# Patient Record
Sex: FEMALE | Race: WHITE | NOT HISPANIC OR LATINO | Employment: FULL TIME | URBAN - METROPOLITAN AREA
[De-identification: names, ages, dates, MRNs, and addresses within clinical notes are randomized per-mention and may not be internally consistent; named-entity substitution may affect disease eponyms.]

---

## 2021-09-12 ENCOUNTER — TELEMEDICINE (OUTPATIENT)
Dept: FAMILY MEDICINE CLINIC | Facility: TELEHEALTH | Age: 26
End: 2021-09-12

## 2021-09-12 DIAGNOSIS — R11.2 NON-INTRACTABLE VOMITING WITH NAUSEA, UNSPECIFIED VOMITING TYPE: ICD-10-CM

## 2021-09-12 DIAGNOSIS — R11.0 NAUSEA: Primary | ICD-10-CM

## 2021-09-12 PROCEDURE — 99213 OFFICE O/P EST LOW 20 MIN: CPT | Performed by: NURSE PRACTITIONER

## 2021-09-12 RX ORDER — ONDANSETRON 4 MG/1
4 TABLET, ORALLY DISINTEGRATING ORAL EVERY 8 HOURS PRN
Qty: 9 TABLET | Refills: 0 | Status: SHIPPED | OUTPATIENT
Start: 2021-09-12 | End: 2022-06-30

## 2021-09-12 NOTE — PATIENT INSTRUCTIONS
Nausea and Vomiting, Adult  Nausea is feeling sick to your stomach or feeling that you are about to throw up (vomit). Vomiting is when food in your stomach is thrown up and out of the mouth. Throwing up can make you feel weak. It can also make you lose too much water in your body (get dehydrated). If you lose too much water in your body, you may:  · Feel tired.  · Feel thirsty.  · Have a dry mouth.  · Have cracked lips.  · Go pee (urinate) less often.  Older adults and people with other diseases or a weak body defense system (immune system) are at higher risk for losing too much water in the body. If you feel sick to your stomach and you throw up, it is important to follow instructions from your doctor about how to take care of yourself.  Follow these instructions at home:  Watch your symptoms for any changes. Tell your doctor about them. Follow these instructions to care for yourself at home.  Eating and drinking         · Take an ORS (oral rehydration solution). This is a drink that is sold at pharmacies and stores.  · Drink clear fluids in small amounts as you are able, such as:  ? Water.  ? Ice chips.  ? Fruit juice that has water added (diluted fruit juice).  ? Low-calorie sports drinks.  · Eat bland, easy-to-digest foods in small amounts as you are able, such as:  ? Bananas.  ? Applesauce.  ? Rice.  ? Low-fat (lean) meats.  ? Toast.  ? Crackers.  · Avoid drinking fluids that have a lot of sugar or caffeine in them. This includes energy drinks, sports drinks, and soda.  · Avoid alcohol.  · Avoid spicy or fatty foods.  General instructions  · Take over-the-counter and prescription medicines only as told by your doctor.  · Drink enough fluid to keep your pee (urine) pale yellow.  · Wash your hands often with soap and water. If you cannot use soap and water, use hand .  · Make sure that all people in your home wash their hands well and often.  · Rest at home while you get better.  · Watch your condition  for any changes.  · Take slow and deep breaths when you feel sick to your stomach.  · Keep all follow-up visits as told by your doctor. This is important.  Contact a doctor if:  · Your symptoms get worse.  · You have new symptoms.  · You have a fever.  · You cannot drink fluids without throwing up.  · You feel sick to your stomach for more than 2 days.  · You feel light-headed or dizzy.  · You have a headache.  · You have muscle cramps.  · You have a rash.  · You have pain while peeing.  Get help right away if:  · You have pain in your chest, neck, arm, or jaw.  · You feel very weak or you pass out (faint).  · You throw up again and again.  · You have throw up that is bright red or looks like black coffee grounds.  · You have bloody or black poop (stools) or poop that looks like tar.  · You have a very bad headache, a stiff neck, or both.  · You have very bad pain, cramping, or bloating in your belly (abdomen).  · You have trouble breathing.  · You are breathing very quickly.  · Your heart is beating very quickly.  · Your skin feels cold and clammy.  · You feel confused.  · You have signs of losing too much water in your body, such as:  ? Dark pee, very little pee, or no pee.  ? Cracked lips.  ? Dry mouth.  ? Sunken eyes.  ? Sleepiness.  ? Weakness.  These symptoms may be an emergency. Do not wait to see if the symptoms will go away. Get medical help right away. Call your local emergency services (911 in the [place] Do not drive yourself to the hospital.  Summary  · Nausea is feeling sick to your stomach or feeling that you are about to throw up (vomit). Vomiting is when food in your stomach is thrown up and out of the mouth.  · Follow instructions from your doctor about eating and drinking to keep from losing too much water in your body.  · Take over-the-counter and prescription medicines only as told by your doctor.  · Contact your doctor if your symptoms get worse or you have new symptoms.  · Keep all follow-up  visits as told by your doctor. This is important.  This information is not intended to replace advice given to you by your health care provider. Make sure you discuss any questions you have with your health care provider.  Document Revised: 04/10/2020 Document Reviewed: 05/28/2019  Elsevier Patient Education © 2021 [employer]

## 2021-09-12 NOTE — PROGRESS NOTES
CHIEF COMPLAINT  No chief complaint on file.        [facility]  Rabia Mari is a 26 y.o. female  presents with complaint of food poisoning from chicken she ate last night. She began having n/v and diarrhea this morning which has lingered thru out the day. She has mild cramping in her abdomen and is using a warm heating pad which helps. She reports no fever.     Review of Systems   Constitutional: Negative.    HENT: Negative.    Respiratory: Negative.    Cardiovascular: Negative.  Leg swelling: mild.   Gastrointestinal: Positive for abdominal pain, diarrhea, nausea and vomiting.   Genitourinary: Negative.    Musculoskeletal: Negative.    Skin: Negative.    Neurological: Negative.    Psychiatric/Behavioral: Negative.        History reviewed. No pertinent past medical history.    History reviewed. No pertinent family history.    Social History     Socioeconomic History   • Marital status:      Spouse name: Not on file   • Number of children: Not on file   • Years of education: Not on file   • Highest education level: Not on file         There were no vitals taken for this visit.    PHYSICAL EXAM  Physical Exam   Constitutional: She is oriented to person, place, and time. She appears well-developed and well-nourished. She does not have a sickly appearance. She does not appear ill. No distress.   Pulmonary/Chest: Effort normal.  No respiratory distress.  Abdominal: Abdomen appears normal. Soft. She exhibits no distension. There is no abdominal tenderness.   Per self directed exam   Neurological: She is alert and oriented to person, place, and time.   Psychiatric: She has a normal mood and affect.       No results found for this or any previous visit.    Diagnoses and all orders for this visit:    1. Nausea (Primary)  -     ondansetron ODT (Zofran ODT) 4 MG disintegrating tablet; Place 1 tablet on the tongue Every 8 (Eight) Hours As Needed for Nausea or Vomiting.  Dispense: 9 tablet; Refill: 0    2. Non-intractable  vomiting with nausea, unspecified vomiting type  -     ondansetron ODT (Zofran ODT) 4 MG disintegrating tablet; Place 1 tablet on the tongue Every 8 (Eight) Hours As Needed for Nausea or Vomiting.  Dispense: 9 tablet; Refill: 0    clear liquids only.   No milk or dairy products.   Rest and sip on clear liquids to avoid dehydration  To ED for worsening S/s.       FOLLOW-UP  As discussed during visit with PCP/[facility] if no improvement or Urgent Care/Emergency Department if worsening of symptoms    Patient verbalizes understanding of medication dosage, comfort measures, instructions for treatment and follow-up.    Tiffanie Montanez, FITO  09/12/2021  16:40 EDT    This visit was performed via Telehealth.  This patient has been instructed to follow-up with their primary care provider if their symptoms worsen or the treatment provided does not resolve their illness.

## 2022-06-30 ENCOUNTER — E-VISIT (OUTPATIENT)
Dept: FAMILY MEDICINE CLINIC | Facility: TELEHEALTH | Age: 27
End: 2022-06-30

## 2022-06-30 ENCOUNTER — TELEMEDICINE (OUTPATIENT)
Dept: FAMILY MEDICINE CLINIC | Facility: TELEHEALTH | Age: 27
End: 2022-06-30

## 2022-06-30 DIAGNOSIS — A05.9 FOOD POISONING: Primary | ICD-10-CM

## 2022-06-30 PROCEDURE — 99212 OFFICE O/P EST SF 10 MIN: CPT | Performed by: NURSE PRACTITIONER

## 2022-06-30 RX ORDER — ONDANSETRON 4 MG/1
4 TABLET, ORALLY DISINTEGRATING ORAL EVERY 8 HOURS PRN
Qty: 10 TABLET | Refills: 0 | Status: SHIPPED | OUTPATIENT
Start: 2022-06-30

## 2022-06-30 RX ORDER — ESCITALOPRAM OXALATE 20 MG/1
TABLET ORAL
COMMUNITY

## 2022-06-30 NOTE — PROGRESS NOTES
Subjective   Rabia Mari is a 26 y.o. female.     Her symptoms started yesterday afternoon after she ate a burger from a gas station. Her symptoms started 5-6 hours afterwards. Her symptoms have included diarrhea and some vomiting. Denies sick contacts.        The following portions of the patient's history were reviewed and updated as appropriate: allergies, current medications, past family history, past medical history, past social history, past surgical history, and problem list.    Review of Systems   Constitutional: Positive for appetite change (decreased). Negative for chills, diaphoresis and fever.   HENT: Negative.    Respiratory: Negative.    Gastrointestinal: Positive for abdominal pain (cramping intermittent), diarrhea, nausea and vomiting.   Musculoskeletal: Negative for myalgias.   Neurological: Positive for headache.       Objective   Physical Exam  Constitutional:       General: She is not in acute distress.     Appearance: She is well-developed. She is ill-appearing. She is not diaphoretic.   Pulmonary:      Effort: Pulmonary effort is normal.   Neurological:      Mental Status: She is alert and oriented to person, place, and time.   Psychiatric:         Behavior: Behavior normal.           Assessment & Plan   Diagnoses and all orders for this visit:    1. Food poisoning (Primary)  -     ondansetron ODT (Zofran ODT) 4 MG disintegrating tablet; Place 1 tablet on the tongue Every 8 (Eight) Hours As Needed for Nausea or Vomiting.  Dispense: 10 tablet; Refill: 0                 The use of a video visit has been reviewed with the patient and verbal informed consent has been obtained. Myself and Rabia Mari participated in this visit. The patient is located in [place], [place]. I am located in [place], [place]. Mychart and Zoom were utilized. I spent 10 minutes in the patient's chart for this visit.

## 2022-06-30 NOTE — PATIENT INSTRUCTIONS
"Maintain hydration  - Do not \"chug\" fluids.  Give a few ounces at a time and increase as tolerated  -[place] diet (Dry toast, crackers)  Follow up with PCP if symptoms persist  Go to ER for fever and abdominal pain    "

## 2022-06-30 NOTE — E-VISIT ESCALATED
Patient escalated   Provider Bertha Narayanan chose to escalate patient to another level of care because: Insufficient information to diagnose   Patient was sent the following message:   Based on the information you've provided us, you need to take another step to get care.   What to do now:   Setup a video visit   Please, schedule your video visit   Video visit     You won't be charged for your eVisit. If you paid with a credit card, the charge will be reversed.   Chief Complaint: Heartburn or acid reflux   Patient introduction   Patient is 26-year-old female presenting with nausea for < 2 weeks. Reports symptoms are intermittent. Denies having had GERD symptoms in the past.   Patient-submitted comments   Patient writes: I think I have food poisoning. There weren't any options to choose similar to that. I have vomiting, diarrhea, nausea, and cramping. .   General presentation   GerdQ score: 5, 50% likelihood of GERD   Nausea: Nausea reported once per week.   Denies choking, chronic cough, hoarseness, wheezing, globus pharyngis, belching, and bloating. Denies anorexia, unexplained weight loss, early satiety, odynophagia, and dysphagia. Denies hematochezia, melena, hematemesis, and persistent vomiting. Denies fever.   Chest pain: Patient denies chest pain.   Pregnancy/menstrual status/breastfeeding:   Denies being pregnant. Denies breastfeeding.   Current medications   Denies currently taking any medications for their symptoms.   Reports taking Escitalopram 20 MG [Lexapro].   Medication allergies   None.   Medication contraindication review   No relevant contraindications.   Past medical history   Denies history of gastrointestinal tract cancer in a first-degree relative.   Denies having had an EGD.   Denies having iron-deficiency anemia.   Assessment:   Patient determined to need a level of care not appropriate to be delivered through eVisit.   Plan:   Patient informed of need to seek in-person care   ----------    Electronically signed by FITO Perez on 2022-06-30 at 12:04PM   ----------   Patient Interview Transcript:   Which of these symptoms do you have?    Nausea   Not selected:    Pain in the middle of the upper stomach area (1)    Heartburn, or a burning feeling behind the breastbone (2)    Regurgitation, or movement of stomach acid or undigested food up into the throat or mouth   How many times a week do you have nausea? Select one.    1   Not selected:    2 or 3    4 to 7   Are your symptoms constant, or do they come and go? Select one.    Come and go   Not selected:    Constant    I'm not sure   How long have you had this current episode of symptoms? Select one.    Less than 2 weeks   Not selected:    2 to 4 weeks    1 to 3 months    More than 3 months    I'm not sure   Is this the first time you've had these kinds of symptoms? Select one.    Yes   Not selected:    No    I'm not sure   What makes your symptoms worse? Select all that apply.    None of the above   Not selected:    Stress    Empty stomach    Full stomach    Chocolate    Caffeine    Alcohol    Carbonated drinks, such as soda or sparkling water    Acidic foods, such as tomatoes and oranges    Spicy foods    Large meals or overeating    Lying down    Eating within 2 to 3 hours of bedtime    Nothing makes my symptoms worse    I'm not sure   Have you recently had any of these symptoms? Select all that apply.    None of the above   Not selected:    Loss of appetite    Unexplained weight loss    Feeling full after eating only a small amount    Pain with swallowing    Difficulty swallowing, or feeling as if food is stuck in your throat or chest   Have you recently had any of these symptoms? Select all that apply.    None of the above   Not selected:    Choking    Chronic cough    Hoarse voice    Wheezing    Constant feeling of a lump in your throat    Belching    Bloating   Have you recently had any of these symptoms? Select all that apply.    None of  the above   Not selected:    Blood in your stool    Dark, tarry stools    Vomiting (throwing up) blood    Vomiting (throwing up) more than 3 to 4 times a day for several days   Do you have chest pain? You might also feel it as discomfort, aching, tightness, or squeezing in the chest. Select one.    No   Not selected:    Yes   Have you recently had a fever that can't be explained by a cold or another infection? The gastrointestinal conditions treated here don't cause a fever. Select one.    No   Not selected:    Yes    I'm not sure   Do you currently have or are you currently being treated for iron-deficiency anemia? Iron-deficiency anemia means that your body doesn't have enough iron to make healthy red blood cells. Select one.    No   Not selected:    Yes    I'm not sure   Have you ever had an upper endoscopy (EGD) study? An EGD is performed by a gastroenterologist or GI doctor. The doctor uses a thin scope with a camera to look at the lining of the upper digestive tract. Select one.    No   Not selected:    Yes    I'm not sure   Have any of your first-degree relatives had a cancer of the gastrointestinal tract? First-degree relatives include parents, siblings, and children. Gastrointestinal cancers include esophageal cancer, stomach cancer, small intestine cancers, pancreatic cancer, and colon cancer. Select one.    No   Not selected:    Yes    I'm not sure   Are you pregnant? Select one.    No   Not selected:    Yes   When was your last menstrual period? If you don't currently have periods or no longer have periods, please briefly explain.   The patient did not enter any additional information.   Are you breastfeeding? Select one.    No   Not selected:    Yes   What are you currently taking for your symptoms? Select all that apply.    None   Not selected:    Antacids such as Tums, Rolaids, Maalox, or Pepto-Bismol    Pepcid (famotidine)    Zantac (ranitidine)    Tagamet (cimetidine)    Axid (nizatidine)     Prilosec (omeprazole)    Prevacid (lansoprazole)    Nexium (esomeprazole)    Protonix (pantoprazole)    Dexilant (dexlansoprazole)    Aciphex (rabeprazole)    Other (specify)   Are you taking any other medications or supplements? On the next screen, you need to list all vitamins, supplements, non-prescription medications (such as aspirin or Aleve), and prescription medications that you're taking. Select one.    Yes   Not selected:    Yes, but I'm not sure what they are    No   Have you ever had an allergic or bad reaction to any medication? Select one.    No   Not selected:    Yes   Is there anything else you'd like to tell us about your symptoms?    I think I have food poisoning. There weren't any options to choose similar to that. I have vomiting, diarrhea, nausea, and cramping.   ----------   Medical history   Medical history data does not currently exist for this patient.

## 2022-12-29 ENCOUNTER — E-VISIT (OUTPATIENT)
Dept: FAMILY MEDICINE CLINIC | Facility: TELEHEALTH | Age: 27
End: 2022-12-29
Payer: MEDICAID

## 2022-12-29 PROCEDURE — BRIGHTMDVISIT: Performed by: NURSE PRACTITIONER

## 2022-12-29 NOTE — E-VISIT TREATED
Chief Complaint: Migraine or headache   Patient introduction   Patient is 27-year-old female who has bilateral headache pain.   Warning. The following may warrant further investigation:    Pain is worse when patient is upright (and better when they are lying down)   Patient requests a 1-day excuse note.   General presentation   Patient's headache pain is moderate. Headache has lasted 4 to 72 hours. Headache reached maximum intensity in 30 to 60 minutes. Headache pain described as throbbing/pulsating. Headache pain is not worse with Valsalva maneuvers.   Pain is not brought on by intense physical activity. No fever, nasal congestion, nasal discharge, or facial pain.   Migraine features: - Pain is accompanied by nausea, vomiting, photophobia, phonophobia, and decreased ability to perform regular daily activities. - No prodrome present in the 1 to 2 days leading up to headache, including no constipation, depression, euphoria, food cravings, increased yawning, irritability, or neck stiffness. - Has auditory aura just before or at the beginning of their headaches. - No postdrome, including no feelings of being drained or exhausted, mild euphoria, or pain in the location of the previous headache with sudden head movement. - No blurry vision with neck flexion, diminished peripheral vision, double vision, severe reduction in vision, halos, or complete vision loss in one eye.   Potential triggers: sleep pattern changes and stress.   Patient has not made recent changes in hormonal birth control method.   Patient has had similar headaches in the past. Has not seen a healthcare provider for their headaches. Headaches are not getting worse over time. Patient has not had a headache every day in the past month. In the past 3 months, patient has had headaches on 2 to 14 days per month.   No known family history of migraine.   Patient is using medications 0 to 1 days per week to treat their headache.   Prescription medications:    Has not used any prescription medications for headache symptoms.   Non-prescription medications:   The following non-prescription medications were helpful for headache symptoms: - Acetaminophen   Review of red flags/alarm symptoms:    No exposure to carbon monoxide    No recent head trauma    No unexplained fever    No chronic night sweats    No prolonged, severe fatigue    No unintentional weight loss    No confusion    No impaired alertness    No altered speech    No loss of coordination    No prolonged limb numbness    No seizures    No diaphoresis or tachycardia    No current use of blood thinning medication    No exposure to toxic chemicals/ingredients   Self-exam:    No nuchal rigidity    Able to walk on toes/heels    Able to rise from a seated position without support    Pericranial muscle tenderness in the forehead   Pregnancy/menstrual status/breastfeeding:   Not pregnant. Pre-eclampsia screening: Patient has not had a baby in last 6 weeks. Not breastfeeding. Regarding last menstrual period, patient writes: I don't currently have periods due to my IUD. . Patient does not currently menstruate or no longer menstruates.   Past medical history   Immune conditions: No immunocompromising conditions. No history of cancer.   No COPD, Lyme disease, obesity hypoventilation syndrome, or obstructive sleep apnea.   Social history   Patient does not smoke tobacco. No recreational drug use.   Current medications   Currently taking escitalopram 20 MG tablet.   Medication allergies   None.   Medication contraindication review   No history of ASA- or NSAID-induced asthma or urticaria, aspirin triad, bone marrow depression, phenothiazine-induced blood dyscrasia, catecholamine-releasing paraganglioma, coagulation disorder, depression, G6PD deficiency, GI bleeding, GI obstruction, uncontrolled hypertension, Parkinson's disease, Reye syndrome, or seizure disorder.   No history of cardiac accessory conduction pathway disorder,  cerebrovascular disease, coronary vasospasm, chronic kidney disease, hepatic impairment (severe), ischemic bowel disease, ischemic heart disease, migraine (basilar or hemiplegic), PVD, sepsis, vascular surgery (recent), or WPW syndrome.   Assessment   Migraine.   This is the likely diagnosis based on patient's interview responses, including: - Throbbing or pulsating pain - Moderate intensity    Presence of nausea, vomiting, photophobia, phonophobia, and decreased ability to perform regular daily activities    Duration of headache 4 to 72 hours   Plan   Medications:    ibuprofen 800 mg tablet RX 800mg 1 tab PO q8h PRN 7d PRN for pain. For best results, take this medication as soon as you feel headache symptoms. Take with food to avoid an upset stomach. Do not take more than 3200mg (4 tablets) in a 24-hour period. Amount is 18 tab.   The patient's prescription will be sent to:   [facility] [id number]   [address] [place] [address]   Phone: [phone]     Fax: [phone]   Other:   Patient was given an excuse note for 1 day.   Education:    Condition and causes    Prevention    Treatment and self-care    When to call provider, including strict return precautions if pain hasn't resolved within 24 hours   ----------   Electronically signed by FITO Guthrie on 2022-12-29 at 14:35PM   ----------   Patient Interview Transcript:   Where do you feel your headache pain? Select one.    On both sides of my head (or all over)   Not selected:    On one side of my head (including forehead or back of head)    I'm not sure   How long have you had this headache? Select one.    4 hours to 3 days   Not selected:    Less than 30 minutes    30 minutes to 3 hours    More than 3 days    I don't currently have a headache   How would you describe your headache? Select all that apply.    It's throbbing or pulsating   Not selected:    Band-like pressure or tightening    Dull pain    A feeling of head fullness     Pressure-like pain    It feels like I'm wearing a tight cap    None of the above   On a scale of 1 to 10, how severe is your headache pain? If you have a very severe headache, you may need to be seen in person. Select one.    Moderate; strong pain that makes everyday activities harder   Not selected:    Mild; pain is noticeable and distracting, but I can do everyday activities    Severe; intense pain that prevents me from doing some everyday activities    Unbearable; intense pain that prevents me from doing anything    The worst headache of my life   How long did it take for your headache to reach the greatest intensity? The pain of most headaches builds gradually. In rare cases, headache pain comes on suddenly, like a clap of thunder or an explosion, and the pain reaches greatest intensity within 1 minute. Select one.    30 to 60 minutes   Not selected:    Less than 1 minute    1 to 5 minutes    5 to 30 minutes    Longer than 1 hour   Do you currently have any of these symptoms? Select all that apply.    Nausea    Vomiting    Sensitivity to light    Sensitivity to noise    Decreased ability to work, study, or do regular daily activities for at least one day   Not selected:    Headache pain that's worse with physical activity (walking or climbing stairs)    None of the above   Are any of these true for your headache? Select all that apply.    It's worse when I'm upright (and better when I'm lying down)   Not selected:    It's gotten better, but it hasn't gone away    It went away, but then it came back    It wakes me from sleep    It's worse in the morning    It's worse when I bend over    None of the above   Do you currently have any of these symptoms? Select all that apply.    None of these   Not selected:    Fever    Nasal congestion    Thick yellow or green nasal drainage (mucus)    Pain around the eyes or cheeks   Do you currently have any of these symptoms? Select all that apply.    None of the above   Not  "selected:    Confusion    Difficulty staying awake or alert    Difficulty speaking or understanding speech    Difficulty walking    Numbness, tingling, or weakness in any of your limbs that doesn't go away    Seizures    Severe jaw pain    Painful rash that looks like a band of blisters on the same side of your head or face as your headache   Headaches can be a sign of a serious underlying condition. Within the last month, have you had any of these symptoms? Select all that apply.    None of the above   Not selected:    Unexplained fever (no clear source or obvious infection)    Recurring night sweats    Severe fatigue that doesn't improve with rest    Unintentional weight loss   Have you had any of these vision changes? Select all that apply.    None of the above   Not selected:    Blurry vision when moving your chin toward your chest    Loss of peripheral vision (can't see out of the corners of the eyes)    Double vision    Severe difficulty seeing    Seeing halos (bright circles) around lights    Complete loss of vision in one eye   Sore or tight muscles in the neck and shoulders can play a part in causing headaches. Gently press against your head and neck muscles, making small circular movements on the muscles. Do this on both your right and left sides.    Forehead   Not selected:    Jaw    Back of head    Neck    Upper shoulder    None of the above   Is your headache pain worse with Valsalva maneuvers? A common Valsalva maneuver involves closing your mouth, plugging your nose shut, and breathing out gently in a way that \"pops\" your ears. Bearing down as if having a bowel movement is another example of a Valsalva maneuver. Select one.    No   Not selected:    Yes    I'm not sure   Can you move your chin toward your chest?    Yes   Not selected:    No, my neck is too stiff   We'd like you to perform three simple tasks: 1. Get up from a seated position without using your arms (or anyone's help) to push yourself up " 2. Walk on your toes for 10 steps 3. Walk on your heels for 10 steps Can you perform all three tasks? Select one.    Yes   Not selected:    No, I can't do one or more of the tasks   Do any of these seem to trigger your headache symptoms? Select all that apply.    Changes in sleep pattern    Stress   Not selected:    Alcohol    Artificial sweeteners (aspartame)    Bright lights or visual stimuli    Certain foods, such as cured meats or cheese    Certain smells    Hunger    Weather changes    No, not that I know of   Does your headache only occur during or after intense physical activity (jogging, weight lifting, or having sex)? Select one.    No   Not selected:    Yes   In the last 2 weeks, have you had any kind of injury or trauma to your head? Examples of injuries might include hitting your head or being hit in the head. Select one.    No   Not selected:    Yes   In the last month, have you had any known exposure to toxic chemicals? For example, this might include accidentally swallowing antifreeze or inhaling paint fumes. Select one.    No, not that I know of   Not selected:    Yes   Have you had any recent exposure to carbon monoxide? Common sources of carbon monoxide include motor vehicle exhaust, smoke from fires, engine fumes, and non-electric heaters. Select one.    No, not that I know of   Not selected:    Yes   Have you ever had a similar headache in the past? By similar, we mean the location of the pain is the same, the headache feels the same, and the associated symptoms are the same. Select one.    Yes   Not selected:    No   Does it feel like your headaches have gotten worse? For example, are they coming on faster, happening more often, or becoming more painful? Select one.    No, not necessarily   Not selected:    Yes, definitely    I'm not sure   Over the past month, have you had a headache every day? Select one.    No   Not selected:    Yes   Over the past 3 months, how many days per month have you  "had a headache? Select one.    2 to 14 days per month   Not selected:    1 day or less per month    15 or more days per month   How many days per week do you use medication for a headache? This includes both non-prescription and prescription medications. Select one.    0 to 1 day per week   Not selected:    1 to 2 days per week    3 or more days per week    I haven't used any medications   Have you ever seen a healthcare provider for your headaches? Select one.    No   Not selected:    Yes   Do you have a family history of migraine headaches? Select one.    No, not that I know of   Not selected:    Yes   In the 1 to 2 days leading up to your headaches, do you have any of these symptoms? Select all that apply.    No, not that I've noticed   Not selected:    Constipation    Depression    Euphoria (feeling intensely excited or happy)    Food cravings    Increased yawning    Irritability    Neck stiffness   Just before or at the beginning of your headaches, do you have any of these symptoms? Some headaches can be associated with other sensory or physical symptoms. They usually last no longer than an hour and then go away completely. Select all that apply.    Ringing in your ears or hearing other noises or music (auditory aura)   Not selected:    Jerking or other repetitive physical movements (motor aura)    Loss of vision, hearing, feeling, or ability to move a body part (negative aura)    Burning, pain, or tingling on any part of your body (somatosensory aura)    Seeing bright lines, shapes, or objects (visual aura)    Difficulty finding words or other changes in speech    None of the above   Once the headache pain goes away, do you have any of these symptoms? Select all that apply.    None of the above   Not selected:    Feeling drained or exhausted    Mild euphoria (excitement or happiness)    Pain in the location of the previous headache with sudden head movement   Are your headaches associated with \"attacks\" or " "\"spells\" of excessive sweating or fast heart rate? Select all that apply.    No, neither of these   Not selected:    Excessive sweating    Fast heart rate   To make a treatment plan that's safe for you, we need to ask about your health history. Do you regularly take any blood thinning medications? Examples include aspirin, Coumadin (warfarin), Aggrenox (aspirin/dipyridamole), Plavix (clopidogrel), Pradaxa (dabigatran), Xarelto (rivaroxaban), Eliquis (apixaban) and Savaysa (edoxaban). Select one.    No   Not selected:    Yes   Do you smoke tobacco? Select one.    No, never   Not selected:    Yes, every day    Yes, some days    No, I quit   In the last month, have you used amphetamines, cocaine, hallucinogens, heroin, or other opioids? Your response to this question will be shared with your care provider only. Select one.    No   Not selected:    Yes   Do you have any of these conditions? Select all that apply.    None of the above   Not selected:    Chronic obstructive pulmonary disease (COPD)    Lyme disease    Obesity hypoventilation syndrome (also known as Pickwickian syndrome)    Obstructive sleep apnea   Do you have any of these conditions? Scroll to see all options. Select all that apply.    None of the above   Not selected:    Aspirin- or NSAID-induced asthma or urticaria    Aspirin triad, Samter's triad, or aspirin-exacerbated respiratory disease (AERD)    Bone marrow depression    Phenothiazine-induced anemia, leukemia, lymphoma, or myeloma    Catecholamine-releasing paraganglioma    Blood clotting disorder    Depression    G6PD deficiency    Gastrointestinal (GI) bleeding    Gastrointestinal (GI) obstruction    Uncontrolled hypertension    Parkinson's disease    Reye syndrome    Seizure disorder   Do you have any of these conditions that can affect the immune system? Scroll to see all options. Select all that apply.    None of these   Not selected:    History of bone marrow transplant    Chronic kidney " disease    Chronic liver disease (including cirrhosis)    HIV/AIDS    Inflammatory bowel disease (Crohn's disease or ulcerative colitis)    Lupus    Moderate to severe plaque psoriasis    Multiple sclerosis    Rheumatoid arthritis    Sickle cell anemia    Alpha or beta thalassemia    History of solid organ transplant (kidney, liver, or heart)    History of spleen removal    An autoimmune disorder not listed here    A condition requiring treatment with long-term use of oral steroids (such as prednisone, prednisolone, or dexamethasone)   Have you ever been diagnosed with cancer? Select one.    No   Not selected:    Yes, I have cancer now    Yes, but I'm in remission   Have you had any of these conditions or procedures? Scroll to see all options. Select all that apply.    None of the above   Not selected:    Cardiac accessory conduction pathway disorder-associated arrhythmia    Cerebrovascular disease (stroke or TIA)    Coronary vasospasm (angina)    Ischemic bowel disease    Ischemic heart disease (history of myocardial infarction or heart attack)    Kidney disease (chronic)    Liver disease    Metoclopramide-associated dystonic reaction    Migraine diagnosed as basilar or hemiplegic    Peripheral vascular disease (PVD)    Sepsis    Tardive dyskinesia    Vascular surgery (recent)    Lety-Parkinson-White (WPW) syndrome   Are you pregnant? Select one.    No   Not selected:    Yes   Have you had a baby within the last 6 weeks? Select one.    No   Not selected:    Yes   When was your last menstrual period? If you don't currently have periods or no longer have periods, please briefly explain.    I don't currently have periods due to my IUD.   Do your headaches seem to be related to your menstrual cycle? For example, do you get a headache around the same time every month? Select one.    I don't currently have periods or no longer have periods   Not selected:    Yes    No    I'm not sure   Are you breastfeeding? Select  one.    No   Not selected:    Yes   Have you recently made any changes to your hormonal birth control method (pill, ring, implant, injection, IUD with progesterone)? Select one.    No   Not selected:    Started using hormonal birth control    Stopped using hormonal birth control    Switched hormonal birth control methods    I don't use hormonal birth control   Have you used any non-prescription medications for your headache symptoms? Select one.    Yes   Not selected:    No   Acetaminophen (Tylenol)    Helpful   Not selected:    Not helpful   Have you used any prescription medications for your headache symptoms? Select one.    No   Not selected:    Yes   Are you still taking these medications listed in your medical record? If you're not taking any of these, click Next. Select all that apply.    escitalopram 20 MG tablet   Are you taking any other medications, vitamins, or supplements? Select one.    No   Not selected:    Yes   Have you ever had an allergic or bad reaction to any medication? Select one.    No   Not selected:    Yes   Do you need a doctor's note? A doctor's note confirms that you received care today and states when you can return to school or work. It does not contain information about your diagnosis or treatment plan. Your provider will make the final decision on whether to give you a doctor's note. Doctor's notes CANNOT be backdated. Select one.    Today only (1 day)   Not selected:    Today and tomorrow (2 days)    3 days    No   Is there anything else you'd like to tell us about your symptoms?   The patient did not enter any additional information.   ----------   Medical history   Medical history data does not currently exist for this patient.

## 2022-12-29 NOTE — EXTERNAL PATIENT INSTRUCTIONS
View Doctor's Note     Diagnosis   Migraine   My name is Nereyda Zabala, and I'm a healthcare provider at [facility]. Based on your interview responses, I see that you have a migraine.   I've given you a doctor's note for 1 day.   Medications   Your pharmacy   [facility] [id number] [id number] [phone]     Prescription   Ibuprofen (800mg): Take 1 tablet by mouth every 8 hours as needed for pain for up to 7 days. For best results, take this medication as soon as you feel headache symptoms. Take with food to avoid an upset stomach. Do not take more than 3200mg (4 tablets) in a 24-hour period.    I've given you a prescription dose of ibuprofen. If it's more affordable or convenient, you may use the equivalent amount of non-prescription ibuprofen. For example, instead of taking one 800 mg ibuprofen tablet, you may take four 200 mg ibuprofen tablets. Don't take ibuprofen with other non-steroidal anti-inflammatory drugs (NSAIDs), including naproxen or aspirin. Taking these medications together can increase the risk of serious side effects.   About your diagnosis   Nearly 90% of all headaches can be placed into one of three categories: migraines, tension-type headaches, and cluster headaches. Common signs and symptoms of a migraine include:    Significant pain on one side of the head. However, in about 30% of migraine sufferers, pain is felt on both sides of the head.    Pain that comes on gradually. That pain may build up over several hours to several days, from dull, deep, and steady to pulsating or throbbing.    Nausea or vomiting.    Sensitivity to bright lights or noise.    Pain that feels worse with physical activity.    An aura. These are symptoms that occur before the headache begins and may include flashing lights or bright spots, strange smells, and tingling in the lips, tongue, or fingers.   Scientists and medical professionals don't really know what causes  "migraines. Both genetics and environmental factors seem to play a role. Women are three times more likely to have migraines than men. For some women, their migraine attacks began or became worse during a pregnancy.   Migraines may be severe and uncomfortable at the time, but they don't cause any lasting effects.   What to expect   Most migraine headaches last a few hours. In some cases, migraines may take longer to get better, sometimes up to a few days.   When to seek care   Call us at [phone]   with any sudden or unexpected symptoms.    Your headache comes with seizures, loss of consciousness, mental confusion, trouble speaking, or vision changes.    Your headache is associated with a fever over 103F or a fever that lasts more than 3 days.    Your headache comes on suddenly (sometimes called a \"thunderclap\") and is extremely painful.    Your headache can be described as the \"worst headache of your life.\"    Your headache gets worse immediately after doing some intense physical activity (such as jogging, weight lifting, or having sex).    You mentioned your headache has one or more of these features: it wakes you up, it's worse in the morning or when you bend over, or it's better when you lie down and worse when you're upright. There's a small chance your headache may be due to a more complicated condition. Call us immediately if your headache gets worse or doesn't get better within 24 hours of following your treatment plan.    You have headaches on 15 or more days per month over a 3-month period.    You have changes in the location of your headache pain, severity of pain, frequency of headache, or you develop other symptoms associated with your headache.   Other treatment   Taking headache medications on more than 3 days a week can cause \"medication-overuse\" or \"rebound\" headaches. If you need to take more medications or take medications more often, contact us to make an appointment.   People with " migraine headaches often feel better if they lie down in a dark, quiet room. Non-traditional therapies can also be helpful. These therapies include acupuncture, biofeedback (which teaches you how to monitor and manage your stress), massage therapy, and cognitive behavioral therapy.   Prevention   Migraine headaches may be triggered by:    Hormonal changes due to menstruation, pregnancy, or contraceptives (such as birth control pills).    Stress.    Hunger.    Changes in sleep patterns, including both lack of sleep (fatigue) and getting too much sleep.    Weather changes.    Bright lights or other visual stimuli.    Consuming certain foods or drinks, especially those that contain the sweetener aspartame. Processed foods and alcohol, especially wine and highly carbonated beverages, may also trigger migraines.   Because there are many different migraine triggers, some people find it helpful to keep a headache diary. Keep track of when you have headaches and write down notes about food, sleep, stress, and the weather. A headache diary can help you figure out your migraine triggers so you can try to avoid them.   The treatment plan that works best for you will be one that's tailored to you and your triggers. You may find that a triptan medication works for you. Or you may find that taking ibuprofen as soon as you feel a headache, prodrome, or aura helps your symptoms. It may take some trial and error to find the best prevention and treatment plan. Keeping a headache diary is especially helpful while you figure this out.   Your provider   Your diagnosis was provided by Nereyda Zabala, a member of your trusted care team at [facility].   If you have any questions, call us at [phone]  .   View Doctor's Note     Expires on 01/28/23

## 2024-07-15 ENCOUNTER — E-VISIT (OUTPATIENT)
Dept: FAMILY MEDICINE CLINIC | Facility: TELEHEALTH | Age: 29
End: 2024-07-15

## 2024-07-15 PROCEDURE — BRIGHTMDVISIT: Performed by: NURSE PRACTITIONER

## 2024-07-15 NOTE — E-VISIT TREATED
Chief Complaint: Migraine or headache   Patient introduction   Patient is 28-year-old female who has bilateral headache pain.   General presentation   Patient's headache pain is moderate. Headache has lasted 4 to 72 hours. Headache reached maximum intensity in 5 to 30 minutes. Headache pain described as fullness. Pain is not associated with a certain position, does not wake them from sleep, and is not worse in the morning. Headache pain is not worse with Valsalva maneuvers.   Pain is not brought on by intense physical activity. No fever, nasal congestion, nasal discharge, or facial pain.   Migraine features:    Pain is accompanied by decreased ability to perform regular daily activities.    No blurry vision with neck flexion, diminished peripheral vision, double vision, severe reduction in vision, halos, or complete vision loss in one eye.   Headaches are not triggered by alcohol, aspartame, visual stimuli, certain foods, certain smells, sleep pattern changes, hunger, stress, or weather changes.   Patient has not made recent changes in hormonal birth control method.   Patient has had similar headaches in the past. Patient has been diagnosed with cluster headaches. Headaches are not getting worse over time. Patient has not had a headache every day in the past month. In the past 3 months, patient has had headaches on 1 day per month.   No known family history of migraine.   Patient is using medications 0 to 1 days per week to treat their headache.   Prescription medications:   Has not used any prescription medications for headache symptoms.   Non-prescription medications:   The following non-prescription medications were helpful for headache symptoms:    Acetaminophen    Excedrin Tension Headache (acetaminophen/caffeine)   Review of red flags/alarm symptoms:    No exposure to carbon monoxide    No recent head trauma    No unexplained fever    No chronic night sweats    No prolonged, severe fatigue    No  unintentional weight loss    No confusion    No impaired alertness    No altered speech    No loss of coordination    No prolonged limb numbness    No seizures    No diaphoresis or tachycardia    No current use of blood thinning medication    No exposure to toxic chemicals/ingredients   Self-exam:    No nuchal rigidity    Able to walk on toes/heels    Able to rise from a seated position without support    No pericranial muscle tenderness in the forehead, jaw, occiput, neck, or upper shoulders   Pregnancy/menstrual status/breastfeeding:   Not pregnant. Pre-eclampsia screening: Patient has not had a baby in last 6 weeks. Not breastfeeding. Regarding date of last menstrual period, patient writes: I have an IUD. I don't have periods. Patient does not currently menstruate or no longer menstruates.   Current medications   Currently taking escitalopram 20 MG tablet and Bariatric Multivitamins with 45 mg Iron.   Medication allergies   None.   Medication contraindication review   No history of ASA- or NSAID-induced asthma or urticaria, aspirin triad, bone marrow depression, phenothiazine-induced blood dyscrasia, catecholamine-releasing paraganglioma, coagulation disorder, depression, G6PD deficiency, GI bleeding, GI obstruction, uncontrolled hypertension, Parkinson's disease, Reye syndrome, or seizure disorder.   No history of cardiac accessory conduction pathway disorder, cerebrovascular disease, coronary vasospasm, chronic kidney disease, hepatic impairment (severe), ischemic bowel disease, ischemic heart disease, migraine (basilar or hemiplegic), PVD, sepsis, vascular surgery (recent), or WPW syndrome.   Past medical history   Immune conditions: No immunocompromising conditions.   No history of cancer.   No COPD, Lyme disease, obesity hypoventilation syndrome, or obstructive sleep apnea.   Social history   Patient does not smoke tobacco. No recreational drug use.   Patient requests a 2-day excuse note.   Assessment    Tension-type headache.   This is the likely diagnosis based on patient's interview responses, including:    Pain is not throbbing or pulsating    Moderate intensity   Plan   Medications:   No medications prescribed.   Other:   Patient was given an excuse note for 2 days.   Education:    Condition and causes    Prevention    Treatment and self-care    When to call provider, including strict return precautions if pain hasn't resolved within 24 hours   ----------   Electronically signed by FITO Perez on 2024-07-15 at 17:37PM   ----------   Patient Interview Transcript:   Where do you feel your headache pain? Select one.    On both sides of my head (or all over)   Not selected:    On one side of my head (including forehead or back of head)    I'm not sure   How long have you had this headache? Select one.    4 hours to 3 days   Not selected:    Less than 30 minutes    30 minutes to 3 hours    More than 3 days    I don't currently have a headache   How would you describe your headache? Select all that apply.    A feeling of head fullness   Not selected:    Band-like pressure or tightening    Dull pain    Pressure-like pain    It's throbbing or pulsating    It feels like I'm wearing a tight cap    None of the above   On a scale of 1 to 10, how severe is your headache pain? If you have a very severe headache, you may need to be seen in person. Select one.    Moderate; strong pain that makes everyday activities harder   Not selected:    Mild; pain is noticeable and distracting, but I can do everyday activities    Severe; intense pain that prevents me from doing some everyday activities    _Unbearable; intense pain that prevents me from doing anything _    The worst headache of my life   How long did it take for your headache to reach the greatest intensity? The pain of most headaches builds gradually. In rare cases, headache pain comes on suddenly, like a clap of thunder or an explosion, and the pain reaches  greatest intensity within 1 minute. Select one.    5 to 30 minutes   Not selected:    Less than 1 minute    1 to 5 minutes    30 to 60 minutes    Longer than 1 hour   Do you currently have any of these symptoms? Select all that apply.    Decreased ability to work, study, or do regular daily activities for at least one day   Not selected:    Nausea    Vomiting    Sensitivity to light    Sensitivity to noise    Headache pain that's worse with physical activity (walking or climbing stairs)    None of the above   Are any of these true for your headache? Select all that apply.    None of the above   Not selected:    It's gotten better, but it hasn't gone away    It went away, but then it came back    It wakes me from sleep    It's worse in the morning    It's worse when I bend over    It's worse when I'm upright (and better when I'm lying down)   Do you currently have any of these symptoms? Select all that apply.    None of these   Not selected:    Fever    Nasal congestion    Thick yellow or green nasal drainage (mucus)    Pain around the eyes or cheeks   Do you currently have any of these symptoms? Select all that apply.    None of the above   Not selected:    Confusion    Difficulty staying awake or alert    Difficulty speaking or understanding speech    Difficulty walking    Numbness, tingling, or weakness in any of your limbs that doesn't go away    Seizures    Severe jaw pain    Painful rash that looks like a band of blisters on the same side of your head or face as your headache   Headaches can be a sign of a serious underlying condition. Within the last month, have you had any of these symptoms? Select all that apply.    None of the above   Not selected:    Unexplained fever (no clear source or obvious infection)    Recurring night sweats    Severe fatigue that doesn't improve with rest    Unintentional weight loss   Have you had any of these vision changes? Select all that apply.    None of the above   Not  "selected:    Blurry vision when moving your chin toward your chest    Loss of peripheral vision (can't see out of the corners of the eyes)    Double vision    Severe difficulty seeing    Seeing halos (bright circles) around lights    Complete loss of vision in one eye   Sore or tight muscles in the neck and shoulders can play a part in causing headaches. Gently press against your head and neck muscles, making small circular movements on the muscles. Do this on both your right and left sides.    None of the above   Not selected:    Forehead    Jaw    Back of head    Neck    Upper shoulder   Is your headache pain worse with Valsalva maneuvers? A common Valsalva maneuver involves closing your mouth, plugging your nose shut, and breathing out gently in a way that \"pops\" your ears. Bearing down as if having a bowel movement is another example of a Valsalva maneuver. Select one.    No   Not selected:    Yes    I'm not sure   Can you move your chin toward your chest?    Yes   Not selected:    No, my neck is too stiff   We'd like you to perform three simple tasks: 1. Get up from a seated position without using your arms (or anyone's help) to push yourself up 2. Walk on your toes for 10 steps 3. Walk on your heels for 10 steps Can you perform all three tasks? Select one.    Yes   Not selected:    No, I can't do one or more of the tasks   Do any of these seem to trigger your headache symptoms? Select all that apply.    No, not that I know of   Not selected:    Alcohol    Artificial sweeteners (aspartame)    Bright lights or visual stimuli    Certain foods, such as cured meats or cheese    Certain smells    Changes in sleep pattern    Hunger    Stress    Weather changes   Does your headache only occur during or after intense physical activity (jogging, weight lifting, or having sex)? Select one.    No   Not selected:    Yes   In the last 2 weeks, have you had any kind of injury or trauma to your head? Examples of injuries " might include hitting your head or being hit in the head. Select one.    No   Not selected:    Yes   In the last month, have you had any known exposure to toxic chemicals? For example, this might include accidentally swallowing antifreeze or inhaling paint fumes. Select one.    No, not that I know of   Not selected:    Yes   Have you had any recent exposure to carbon monoxide? Common sources of carbon monoxide include motor vehicle exhaust, smoke from fires, engine fumes, and non-electric heaters. Select one.    No, not that I know of   Not selected:    Yes   Have you ever had a similar headache in the past? By similar, we mean the location of the pain is the same, the headache feels the same, and the associated symptoms are the same. Select one.    Yes   Not selected:    No   Does it feel like your headaches have gotten worse? For example, are they coming on faster, happening more often, or becoming more painful? Select one.    No, not necessarily   Not selected:    Yes, definitely    I'm not sure   Over the past month, have you had a headache every day? Select one.    No   Not selected:    Yes   Over the past 3 months, how many days per month have you had a headache? Select one.    1 day or less per month   Not selected:    2 to 14 days per month    15 or more days per month   How many days per week do you use medication for a headache? This includes both non-prescription and prescription medications. Select one.    0 to 1 day per week   Not selected:    1 to 2 days per week    3 or more days per week    I haven't used any medications   Have you ever seen a healthcare provider for your headaches? Select one.    Yes   Not selected:    No   When you saw the healthcare provider, was your headache diagnosed as any of these? Select all that apply.    Cluster headache   Not selected:    Migraine headache    Tension-type headache    Other (specify)   Do you have a family history of migraine headaches? Select one.    No,  "not that I know of   Not selected:    Yes   Are your headaches associated with \"attacks\" or \"spells\" of excessive sweating or fast heart rate? Select all that apply.    No, neither of these   Not selected:    Excessive sweating    Fast heart rate   To make a treatment plan that's safe for you, we need to ask about your health history. Do you regularly take any blood thinning medications? Examples include aspirin, Coumadin (warfarin), Aggrenox (aspirin/dipyridamole), Plavix (clopidogrel), Pradaxa (dabigatran), Xarelto (rivaroxaban), Eliquis (apixaban) and Savaysa (edoxaban). Select one.    No   Not selected:    Yes   Do you smoke tobacco? Select one.    No, never   Not selected:    Yes, every day    Yes, some days    No, I quit   In the last month, have you used amphetamines, cocaine, hallucinogens, heroin, or other opioids? Your response to this question will be shared with your care provider only. Select one.    No   Not selected:    Yes   Do you have any of these conditions? Select all that apply.    None of the above   Not selected:    Chronic obstructive pulmonary disease (COPD)    Lyme disease    Obesity hypoventilation syndrome (also known as Pickwickian syndrome)    Obstructive sleep apnea   Do you have any of these conditions? Scroll to see all options. Select all that apply.    None of the above   Not selected:    Aspirin- or NSAID-induced asthma or urticaria    Aspirin triad, Samter's triad, or aspirin-exacerbated respiratory disease (AERD)    Bone marrow depression    Phenothiazine-induced anemia, leukemia, lymphoma, or myeloma    Catecholamine-releasing paraganglioma    Blood clotting disorder    Depression    G6PD deficiency    Gastrointestinal (GI) bleeding    Gastrointestinal (GI) obstruction    Uncontrolled hypertension    Parkinson's disease    Reye syndrome    Seizure disorder   Do you have any of these conditions that can affect the immune system? Scroll to see all options. Select all that " apply.    None of these   Not selected:    History of bone marrow transplant    Chronic kidney disease    Chronic liver disease (including cirrhosis)    HIV/AIDS    Inflammatory bowel disease (Crohn's disease or ulcerative colitis)    Lupus    Moderate to severe plaque psoriasis    Multiple sclerosis    Rheumatoid arthritis    Sickle cell anemia    Alpha or beta thalassemia    History of kidney, liver, heart, or other solid organ transplant    History of liver, heart, or other solid organ transplant    History of spleen removal    An autoimmune disorder not listed here (specify)    A condition requiring treatment with long-term use of oral steroids (such as prednisone, prednisolone, or dexamethasone) (specify)   Have you ever been diagnosed with cancer? Select one.    No   Not selected:    Yes, I have cancer now    Yes, but I'm in remission   Have you had any of these conditions or procedures? Scroll to see all options. Select all that apply.    None of the above   Not selected:    Cardiac accessory conduction pathway disorder-associated arrhythmia    Cerebrovascular disease (stroke or TIA)    Coronary vasospasm (angina)    Ischemic bowel disease    Ischemic heart disease (history of myocardial infarction or heart attack)    Kidney disease (chronic)    Liver disease    Metoclopramide-associated dystonic reaction    Migraine diagnosed as basilar or hemiplegic    Peripheral vascular disease (PVD)    Sepsis    Tardive dyskinesia    Vascular surgery (recent)    Lety-Parkinson-White (WPW) syndrome   Are you pregnant? Select one.    No   Not selected:    Yes   Have you had a baby within the last 6 weeks? Select one.    No   Not selected:    Yes   When was your last menstrual period? If you don't currently have periods or no longer have periods, please briefly explain.    I have an IUD. I don't have periods   Do your headaches seem to be related to your menstrual cycle? For example, do you get a headache around the same  time every month? Select one.    I don't currently have periods or no longer have periods   Not selected:    Yes    No    I'm not sure   Are you breastfeeding? Select one.    No   Not selected:    Yes   Have you recently made any changes to your hormonal birth control method (pill, ring, implant, injection, IUD with progesterone)? Select one.    No   Not selected:    Started using hormonal birth control    Stopped using hormonal birth control    Switched hormonal birth control methods    I don't use hormonal birth control   Have you used any non-prescription medications for your headache symptoms? Select one.    Yes   Not selected:    No   Acetaminophen (Tylenol)    Helpful   Not selected:    Not helpful   Excedrin Tension Headache (acetaminophen/caffeine)    Helpful   Not selected:    Not helpful   Have you used any prescription medications for your headache symptoms? Select one.    No   Not selected:    Yes   Are you still taking these medications listed in your medical record? If you're not taking any of these, click Next. Select all that apply.    escitalopram 20 MG tablet   Are you taking any other medications, vitamins, or supplements? Select one.    Yes   Not selected:    No   Have you ever had an allergic or bad reaction to any medication? Select one.    No   Not selected:    Yes   Do you need a doctor's note? A doctor's note confirms that you received care today and states when you can return to school or work. It does not contain information about your diagnosis or treatment plan. Your provider will make the final decision on whether to give you a doctor's note. Doctor's notes CANNOT be backdated. Select one.    Today and tomorrow (2 days)   Not selected:    Today only (1 day)    3 days    No   Is there anything you'd like to add about your symptoms? Please limit your comments to the symptoms covered in this interview. If you include comments about other concerns, your provider may recommend that you be  seen in person.   The patient did not enter any additional information.   ----------   Medical history   Medical history data does not currently exist for this patient.

## 2024-07-15 NOTE — EXTERNAL PATIENT INSTRUCTIONS
"   View Doctor's Note     Diagnosis   Tension-type headache   My name is BerthaFITO Davis, and I'm a healthcare provider at [facility]. Based on your interview responses, I see that you have a tension-type headache.   I've given you a doctor's note for 2 days.   About your diagnosis   Nearly 90% of all headaches can be placed into one of three categories: migraines, tension-type headaches, and cluster headaches.   Common signs and symptoms of a tension-type headache include:    Moderate to severe pain or pressure on both sides of the head. Some people say the pain feels like a thick rubber band stretched tight around their head.    Tension in the scalp, neck, and shoulder muscles.    Symptoms that come and go. The pain may be short-lived, or it may be around for up to a week.    Not usually associated with nausea, vomiting, light sensitivity, or noise sensitivity. This headache is rarely made worse by physical activity, such as walking or climbing stairs.   Scientists and medical professionals don't really know what causes tension-type headaches. These headaches can affect a person's quality of life, but they don't cause any long-term problems.   What to expect   A tension-type headache can last anywhere from 30 minutes to several days and sometimes even up to a week. The treatments recommended here will help you feel better soon.   When to seek care   Call us at [phone]   with any sudden or unexpected symptoms.    Your headache comes with seizures, loss of consciousness, weakness, mental confusion, vision changes, or trouble speaking.    Your headache is associated with a fever over 103F or a fever that lasts more than 3 days.    Your headache comes on suddenly (sometimes called a \"thunderclap\") and is very painful.    Your headache is the \"worst headache of your life.\"    You get a headache immediately after intense physical activity (such as jogging, weight lifting, or having sex).    Your " "headache is worse when lying down and gets better when standing up.    Your headache gets worse or doesn't get better, despite following your treatment plan.    You have headaches on 15 or more days per month over a 3-month period.    You have changes in the location of your headache pain, severity of pain, frequency of headache, or you develop other symptoms associated with your headache.   Other treatment   Taking headache medications on more than 3 days a week can cause \"medication-overuse\" or \"rebound\" headaches. If you need to take more medications or take medications more often, contact us to make an appointment.   People with tension-type headaches often feel better by getting plenty of rest, using an ice pack on their head, or taking long, hot showers. Nontraditional therapies may also be helpful. These include acupuncture, acupressure, biofeedback (which teaches you how to monitor and manage your stress), massage therapy, and cognitive behavioral therapy.   Prevention   Tension-type headaches may be triggered (or worsened) by:    Stress.    Neck or shoulder muscle tension.    Weather changes.   Many people find that keeping a headache diary is helpful. Keep track of when you have headaches and write down notes about food, sleep, stress, and the weather. A headache diary can help you figure out your headache patterns and manage your symptoms.   Finally, consider your posture. Poor posture can cause muscle tension, which can lead to headache pain. When standing, hold your shoulders back, keep your head level, and hold in your stomach. When sitting, bend your legs at the knees, keeping your thighs parallel to the ground. Throughout the day, keep your head from tilting forward and your shoulders from inching toward your ears.   Your provider   Your diagnosis was provided by FITO Perez, a member of your trusted care team at [facility].   If you have any questions, call us at [phone]  .   " View Doctor's Note     Expires on 08/14/24

## 2025-07-24 ENCOUNTER — E-VISIT (OUTPATIENT)
Dept: ADMINISTRATIVE | Facility: OTHER | Age: 30
End: 2025-07-24

## 2025-07-24 ENCOUNTER — E-VISIT (OUTPATIENT)
Dept: FAMILY MEDICINE CLINIC | Facility: TELEHEALTH | Age: 30
End: 2025-07-24

## 2025-07-24 NOTE — E-VISIT ESCALATED
Status: Referred Out  Date: 2025 11:09:15  Acuity Level: Not applicable  Referral message: Based on the information you provided during your interview, eVisit is not appropriate for treating your condition.  Patient: Rabia Mari  Patient : 1995  Patient Address: [address] [place] [address]  Patient Phone: [phone]  Clinician Response: Unavailable  Diagnosis: Unavailable  Diagnosis ICD: Unavailable     Patient Interview Questions and Responses: None available

## 2025-07-24 NOTE — E-VISIT TREATED
"Date: 2025 12:31:20  Clinician: Tiffanie Montanez  Clinician NPI: [id number]  Patient: Rabia Mari  Patient : 1995  Patient Address: [address] [place] [address]  Patient Phone: [phone]  Visit Protocol: Gastrointestinal conditions  Patient Summary:  Rabia is a 29 year old ( : 1995 ) female who initiated a visit for evaluation of a gastrointestinal condition.     Symptom details  The symptoms consist of diarrhea.      Bowel function: The last bowel movement was today. Rabia usually has a bowel movement daily.     Diarrhea: The diarrhea started today. Rabia has had 3-7 bowel movements in the past 24 hours. The diarrhea has not changed since the symptoms started. The diarrhea does not interfere with sleep.      Rabia denies unintentional weight loss, blood in stool, jaundice, feeling feverish, nausea, constipation, vomiting, and abdominal pain. Rabia also denies symptoms of dehydration, such as dry mouth, feeling light-headed, being too weak to stand, and   fainting. Rabia denies having stool that is black or tarry within the last week.   Rabia has tried anti-motility medication for the current symptoms. The anti-motility medication was somewhat helpful.   Monterey IV criteria  Note: Monterey IV criteria defined   in clinical decision support (displayed when \"Irritable bowel syndrome with diarrhea\", \"Irritable bowel syndrome with constipation\", or \"Other irritable bowel syndrome\" are selected as assessments).  In the past 3 months, Rabia has never had recurring   discomfort or pain anywhere in the abdomen.   Precipitating events  Rabia has not taken an antibiotic in the past 2 months.   Rabia has not been exposed to someone with the same symptoms at home, , work, or school.   Rabia has traveled outside   of the [place], [place], or [place] within the last 14 days. Countries that Rabia has visited (free text): [place], [place], [place], [place]   " Pertinent medical history  Rabia denies having an injury to the abdomen in the past week and   an abdominal surgery in the past 4 weeks.    Rabia denies having celiac disease, Crohn's disease, diverticulitis, and ulcerative colitis. Rabia also denies having immunosuppressive conditions (e.g. Chemotherapy, HIV, organ transplant, long-term use of   steroids or other immunosuppressive medications, splenectomy).   Rabia denies a family history of celiac disease, irritable bowel syndrome, and ulcerative colitis or Crohn's disease.   Rabia does not get a yeast infection when taking antibiotics.     Rabia denies pregnancy and denies breastfeeding.     MEDICATIONS: multivit-iron-min-folic acid oral, ALLERGIES: NKDA  Clinician Response:  Dear Rabia,  You mentioned that you have diarrhea. Diarrhea usually occurs because of an infection that gets into your gut but can also be caused by viruses, bacteria and parasites, medications, lactose intolerance, or other   digestive disorders. When you have diarrhea, your stools are loose and watery and it normally lasts 2-3 days. You may notice some other symptoms such as bloating in your belly, abdominal cramping, or an urgent feeling that you need to have a bowel   movement. More serious symptoms include blood or mucus in your stool, weight loss, and fever.  Medication information  Unless you are allergic to the over-the-counter medication(s) below, I recommend using:     Loperamide (Imodium or store brand) for your diarrhea. Take 4 mg by mouth 1 time, then 2 mg by mouth after each loose stool until symptoms are controlled (maximum of 4 tablets per day). Please follow the instructions on the package.   Over-the-counter   medications do not require a prescription. Ask the pharmacist if you have any questions.  Self care  I am recommending:   An oral rehydration solution (ORS) to replace fluids lost due to diarrhea. You may use over-the-counter options, such as Rehydralyte   or  Pedialyte, or make your own oral rehydration solution at home. Drink the solution over 3-4 hours. Additionally, drink 10 mL of ORS per 1 kg of body weight for each watery or loose stool, and 2 mL of ORS per 1 kg of body weight for each episode of   vomiting. To make your own oral rehydration solution, use one of the recipes listed below:      Combine 1 quart of water with 3/4 teaspoon of table salt and 2 tablespoons of sugar OR    Combine 3/4 teaspoon of table salt with a 32 oz bottle of Gatorade OR    Combine 2 cups of chicken broth (not low sodium) with 2 cups of water and 2 tablespoons of sugar         For your diarrhea, I recommend including the following foods and beverages in your diet:     Soft fruits (bananas, applesauce, avocado, pumpkin, and melons)    Steamed or boiled vegetables (carrots, green beans, potatoes, and squash)    Low-fiber starches (white bread, white rice, saltine crackers, cream of wheat, instant oatmeal, and noodles)    Unseasoned skinless baked chicken or turkey, scrambled eggs, yogurt and kefir    Bone broth, apple juice, coconut water, weak tea     Avoid the following foods and beverages until your diarrhea has stopped:     Milk and dairy products (yogurt and kefir are okay)    Fried, fatty, greasy and spicy foods    Pork, veal, salmon, and sardines    Raw vegetables (parsnips, beets, sauerkraut, corn on the cob, cabbage, broccoli, cauliflower, and onions)    Citrus fruits (pineapples, oranges, grapefruits, brenda, and limes)    Tomatoes, cherries, grapes, figs, raisins, and seeded berries    Extremely hot or cold beverages    Alcohol    Coffee and caffeinated sodas    Added sugars and sweets like candy, soda, and most juice     Steps you can take to be as comfortable as possible:     To prevent dehydration, drink at least six 8-ounce glasses of fluids each day. Choose electrolyte replacement drinks or soda without caffeine. Chicken broth (without the fat), tea, and sports drinks are  good choices.    Sip small amounts of fluid frequently throughout the day.    Avoid drinks with a lot of sugar, like juice or soda. These can make diarrhea worse.    If you are experiencing a sore rectum due to frequent bowel movements, take a warm bath for relief. Afterward, pat the area (don't rub) with a clean, soft towel. You may also try using an over-the-counter hemorrhoid cream or petroleum jelly on the   affected area.     Preventive measures     Wash your hands with soap and water after you use the bathroom or change a child's diaper, and before you eat.    Avoid changing a child's diaper where food is prepared.    Use alcohol-based hand sanitizers.     For more information on food safety, visit the Centers for Disease Control and Prevention (CDC).  When to seek care  Please make an appointment to be seen in a clinic or urgent care if any of the following occur:     You develop dehydration symptoms, such as fainting or feeling too weak to stand.    You have diarrhea lasting more than 2 weeks.    You vomit up blood, have bloody diarrhea, or have severe belly pain.    You haven't needed to urinate in the past 12 hours.    You have a high fever (above 102 degrees Fahrenheit) or that lasts more than 24 hours.    New symptoms develop, or symptoms become worse      Diagnosis: Diarrhea, unspecified  Diagnosis ICD: R19.7    Follow up instructions: ATTENTION: If you have been prescribed medications, your prescriptions will not be sent until you choose your pharmacy.  To do so open the link within your notification, or go to [email or web address] and click eVisit in the menu to open your   treatment plan. From there, you can select your pharmacy at the bottom of your after visit summary. You can also go to [email or web address]